# Patient Record
Sex: MALE | Race: WHITE | NOT HISPANIC OR LATINO | URBAN - METROPOLITAN AREA
[De-identification: names, ages, dates, MRNs, and addresses within clinical notes are randomized per-mention and may not be internally consistent; named-entity substitution may affect disease eponyms.]

---

## 2019-04-20 ENCOUNTER — EMERGENCY (EMERGENCY)
Facility: HOSPITAL | Age: 79
LOS: 0 days | Discharge: HOME | End: 2019-04-20
Attending: EMERGENCY MEDICINE | Admitting: EMERGENCY MEDICINE
Payer: MEDICARE

## 2019-04-20 DIAGNOSIS — R10.9 UNSPECIFIED ABDOMINAL PAIN: ICD-10-CM

## 2019-04-20 DIAGNOSIS — K59.00 CONSTIPATION, UNSPECIFIED: ICD-10-CM

## 2019-04-20 PROCEDURE — 74019 RADEX ABDOMEN 2 VIEWS: CPT | Mod: 26

## 2019-04-20 PROCEDURE — 99283 EMERGENCY DEPT VISIT LOW MDM: CPT

## 2019-04-20 RX ORDER — LACTULOSE 10 G/15ML
30 SOLUTION ORAL
Qty: 150 | Refills: 0 | OUTPATIENT
Start: 2019-04-20 | End: 2019-04-24

## 2019-04-20 RX ORDER — LACTULOSE 10 G/15ML
30 SOLUTION ORAL ONCE
Qty: 0 | Refills: 0 | Status: COMPLETED | OUTPATIENT
Start: 2019-04-20 | End: 2019-04-20

## 2019-04-20 RX ADMIN — LACTULOSE 20 GRAM(S): 10 SOLUTION ORAL at 14:51

## 2019-04-20 RX ADMIN — Medication 1 ENEMA: at 18:23

## 2019-04-20 NOTE — ED PROVIDER NOTE - CARE PROVIDER_API CALL
Omer De Jesus)  Gastroenterology  [address] [place] [address]  Phone: [phone]  Fax: [phone]  Follow Up Time:

## 2019-04-20 NOTE — ED PROVIDER NOTE - OBJECTIVE STATEMENT
77 y/o male with hx of hypothyroid , no PSX , presents with abdominal cramping and distention x 2 days. patient last BM was this am , small amount without any blood . patient with hx of chronic constipation. patient c/o nausea snd decreased appetite . patient denies any fever,chills, back pain, dysuria. patient without any syncope or chest pain

## 2019-04-20 NOTE — ED PROVIDER NOTE - ATTENDING CONTRIBUTION TO CARE
Pt complains of constipation. No BM in three days. This am was drinking water a few glasses and became full, abdominal distention, nausea and vomited. Also felt abdominal pain. Pt came in ambulance. complains of severe constipation. On exam S1S2 rrr, lungs clear, abdomen is soft , nontender, + tympany, good bowel sound, no rash, no back tenderness. Comfortable.

## 2019-04-20 NOTE — ED PROVIDER NOTE - PROGRESS NOTE DETAILS
No bowel movement. Agrees to try enema. patient with large bowel movement after enema. feeling improved. will dc home

## 2019-04-20 NOTE — ED ADULT TRIAGE NOTE - CHIEF COMPLAINT QUOTE
"Last night at 5 p.m. I developed abdominal pain. I feel bloated. I vomited." Pt advises feeling chills.

## 2019-04-20 NOTE — ED PROVIDER NOTE - CLINICAL SUMMARY MEDICAL DECISION MAKING FREE TEXT BOX
Pt with a history of constipation. Eats fiber rich food. Does not exercise. Discussion on management of constipation. In the ER disimpaction by PA not successful as stool not found. lactulose with enema offered good relief and pt felt good. No abdominal or back pain.

## 2019-04-20 NOTE — ED ADULT NURSE NOTE - NSIMPLEMENTINTERV_GEN_ALL_ED
Implemented All Universal Safety Interventions:  [place] to call system. Call bell, personal items and telephone within reach. Instruct patient to call for assistance. Room bathroom lighting operational. Non-slip footwear when patient is off stretcher. Physically safe environment: no spills, clutter or unnecessary equipment. Stretcher in lowest position, wheels locked, appropriate side rails in place.

## 2019-04-20 NOTE — ED PROVIDER NOTE - NS ED ROS FT
Review of Systems    Constitutional: (-) fever or chills  respiratory: (-) cough (-) shortness of breath  Cardiovascular: (-) syncope, palpitations or chest pain  Integumentary: (-) rash or painful lymph nodes  Neurological: (-) altered mental status, headache or head injury  GI: +distended abdomen +constipation   : no dysuria